# Patient Record
Sex: MALE | Race: WHITE
[De-identification: names, ages, dates, MRNs, and addresses within clinical notes are randomized per-mention and may not be internally consistent; named-entity substitution may affect disease eponyms.]

---

## 2020-09-13 ENCOUNTER — HOSPITAL ENCOUNTER (EMERGENCY)
Dept: HOSPITAL 8 - ED | Age: 60
LOS: 1 days | Discharge: HOME | End: 2020-09-14
Payer: SELF-PAY

## 2020-09-13 VITALS — HEIGHT: 69 IN | WEIGHT: 174.17 LBS | BODY MASS INDEX: 25.8 KG/M2

## 2020-09-13 VITALS — SYSTOLIC BLOOD PRESSURE: 114 MMHG | DIASTOLIC BLOOD PRESSURE: 90 MMHG

## 2020-09-13 DIAGNOSIS — R00.0: ICD-10-CM

## 2020-09-13 DIAGNOSIS — R94.5: ICD-10-CM

## 2020-09-13 DIAGNOSIS — R41.82: Primary | ICD-10-CM

## 2020-09-13 DIAGNOSIS — R56.9: ICD-10-CM

## 2020-09-13 DIAGNOSIS — E87.6: ICD-10-CM

## 2020-09-13 LAB
<PLATELET ESTIMATE>: ADEQUATE
ALBUMIN SERPL-MCNC: 3.1 G/DL (ref 3.4–5)
ALP SERPL-CCNC: 107 U/L (ref 45–117)
ALT SERPL-CCNC: 93 U/L (ref 12–78)
ANION GAP SERPL CALC-SCNC: 13 MMOL/L (ref 5–15)
ANISOCYTOSIS BLD QL SMEAR: (no result)
BASOPHILS NFR BLD MANUAL: 2 % (ref 0–1)
BASOS#(MANUAL): 0.18 X10^3/UL (ref 0–0.1)
BILIRUB SERPL-MCNC: 3.3 MG/DL (ref 0.2–1)
CALCIUM SERPL-MCNC: 8.7 MG/DL (ref 8.5–10.1)
CHLORIDE SERPL-SCNC: 97 MMOL/L (ref 98–107)
CREAT SERPL-MCNC: 0.82 MG/DL (ref 0.7–1.3)
EOS#(MANUAL): 0.27 X10^3/UL (ref 0–0.4)
EOS% (MANUAL): 3 % (ref 1–7)
ERYTHROCYTE [DISTWIDTH] IN BLOOD BY AUTOMATED COUNT: 14.4 % (ref 9.4–14.8)
LG PLATELETS BLD QL SMEAR: (no result)
LYMPH#(MANUAL): 1.35 X10^3/UL (ref 1–3.4)
LYMPHS% (MANUAL): 15 % (ref 22–44)
MACROCYTES BLD QL SMEAR: (no result)
MCH RBC QN AUTO: 32.2 PG (ref 27.5–34.5)
MCHC RBC AUTO-ENTMCNC: 33.4 G/DL (ref 33.2–36.2)
MCV RBC AUTO: 96.3 FL (ref 81–97)
MD: YES
MONOS#(MANUAL): 1.08 X10^3/UL (ref 0.3–2.7)
MONOS% (MANUAL): 12 % (ref 2–9)
PLATELET # BLD AUTO: 153 X10^3/UL (ref 130–400)
PMV BLD AUTO: 8.3 FL (ref 7.4–10.4)
PROT SERPL-MCNC: 6.5 G/DL (ref 6.4–8.2)
RBC # BLD AUTO: 4.65 X10^6/UL (ref 4.38–5.82)
REACTIVE LYMPHS # (MANUAL): 0.18 X10^3/UL (ref 0–0)
REACTIVE LYMPHS % (MANUAL): 2 % (ref 0–0)
SEG#(MANUAL): 5.94 X10^3/UL (ref 1.8–6.8)
SEGS% (MANUAL): 66 % (ref 42–75)
TROPONIN I SERPL-MCNC: < 0.015 NG/ML (ref 0–0.04)

## 2020-09-13 PROCEDURE — 80307 DRUG TEST PRSMV CHEM ANLYZR: CPT

## 2020-09-13 PROCEDURE — 93005 ELECTROCARDIOGRAM TRACING: CPT

## 2020-09-13 PROCEDURE — 96361 HYDRATE IV INFUSION ADD-ON: CPT

## 2020-09-13 PROCEDURE — 36415 COLL VENOUS BLD VENIPUNCTURE: CPT

## 2020-09-13 PROCEDURE — 84484 ASSAY OF TROPONIN QUANT: CPT

## 2020-09-13 PROCEDURE — 96374 THER/PROPH/DIAG INJ IV PUSH: CPT

## 2020-09-13 PROCEDURE — 85025 COMPLETE CBC W/AUTO DIFF WBC: CPT

## 2020-09-13 PROCEDURE — 80053 COMPREHEN METABOLIC PANEL: CPT

## 2020-09-13 PROCEDURE — 70450 CT HEAD/BRAIN W/O DYE: CPT

## 2020-09-13 PROCEDURE — 99285 EMERGENCY DEPT VISIT HI MDM: CPT

## 2020-09-13 NOTE — NUR
THIS IS A 59 YO MALE BIB [facility] FROM HOME FOR POSSIBLE SEIZURE. PATIENT WAS 
FOUND BY ROOMMATE SITTING SIDWAY IN A CHAIR, GROGGY, AND NOT VERY RESPONSIVE. 
A&OX2 UPON EMS ARRIVAL AND ARRIVAL TO ED, NO KNOWN HX SEIZURES. ABOUT 20MINS 
INTO ARRIVAL PATIENT IS NOW A&OX4. [facility] 15. MOVES ALL EXTREMITIES WELL. PERRLA. 
ALL MONITORING IN PLACE, SINUS TACHYCARDIA NOTED ON MONITOR. PIV PLACED BY 
JAVIER, IVF STARTED, MEDICATED PER EMAR. TOLERATED WELL. BROTHER IN ROOM AT THIS 
TIME, AND DENIES ANY MEDICAL HX OF PATIENT AS WELL. CALL LIGHT IN REACH

## 2020-09-14 NOTE — NUR
Patient given discharge instructions and they have confirmed that they 
understand the instructions. Brother in room to also hear discharge 
instructions. Patient wheeled to discharge with brother.